# Patient Record
Sex: MALE | Race: ASIAN | NOT HISPANIC OR LATINO | URBAN - METROPOLITAN AREA
[De-identification: names, ages, dates, MRNs, and addresses within clinical notes are randomized per-mention and may not be internally consistent; named-entity substitution may affect disease eponyms.]

---

## 2024-05-14 ENCOUNTER — EMERGENCY (EMERGENCY)
Facility: HOSPITAL | Age: 24
LOS: 1 days | Discharge: HOME | End: 2024-05-14
Admitting: EMERGENCY MEDICINE
Payer: COMMERCIAL

## 2024-05-14 VITALS
RESPIRATION RATE: 18 BRPM | OXYGEN SATURATION: 100 % | TEMPERATURE: 97 F | DIASTOLIC BLOOD PRESSURE: 74 MMHG | SYSTOLIC BLOOD PRESSURE: 124 MMHG | HEART RATE: 98 BPM
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
## 2024-05-14 DIAGNOSIS — M25.571 PAIN IN RIGHT ANKLE AND JOINTS OF RIGHT FOOT: ICD-10-CM
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
## 2024-05-14 DIAGNOSIS — S93.401A SPRAIN OF UNSPECIFIED LIGAMENT OF RIGHT ANKLE, INITIAL ENCOUNTER: ICD-10-CM

## 2024-05-14 PROCEDURE — 99284 EMERGENCY DEPT VISIT MOD MDM: CPT

## 2024-05-14 PROCEDURE — 73610 X-RAY EXAM OF ANKLE: CPT | Mod: 26,RT

## 2024-05-14 RX ORDER — IBUPROFEN 200 MG
600 TABLET ORAL ONCE
Refills: 0 | Status: COMPLETED | OUTPATIENT
Start: 2024-05-14 | End: 2024-05-14

## 2024-05-14 RX ADMIN — Medication 600 MILLIGRAM(S): at 23:35

## 2024-05-14 NOTE — ED ADULT NURSE NOTE - OBJECTIVE STATEMENT
pt presents to ed for right ankle pain and swelling after twisting it while playing badminton. given ice packs

## 2024-05-14 NOTE — ED ADULT TRIAGE NOTE - CHIEF COMPLAINT QUOTE
Walk in pt with complaints of right ankle pain after twisting it while playing badminton 20 min ago.

## 2024-05-15 NOTE — ED PROVIDER NOTE - CLINICAL SUMMARY MEDICAL DECISION MAKING FREE TEXT BOX
well appearing here with R ankle swelling and pain after a twisting inj to the R ankle pain with ttp over the R atfl, neurovascular intact, will r/o fx plan: xr ankle

## 2024-05-15 NOTE — ED PROVIDER NOTE - PHYSICAL EXAMINATION
Physical Exam    Vital Signs: I have reviewed the initial vital signs.  Constitutional: well-appearing, appears stated age  Cardiovascular: regular rate, regular rhythm, well-perfused extremities  Musculoskeletal: supple neck, +R ankle swelling and ttp along the ATFL, no deformity  Integumentary: warm, dry, no rash  Neurologic: LE extremities’ motor and sensory functions grossly intact

## 2024-05-15 NOTE — ED PROVIDER NOTE - OBJECTIVE STATEMENT
24 yo m no sig pmhx pt was play [id number] and twisted his R ankle -- here with acute onset of R ankle pain aching mod in severity with swelling along the ATFL able to bare weight initially but increased in severity over the last 1hr.    I have reviewed available current nursing and previous documentation of past medical, surgical, family, and/or social history.

## 2024-05-15 NOTE — ED PROVIDER NOTE - PATIENT PORTAL LINK FT
You can access the FollowMyHealth Patient Portal offered by [facility] by registering at the following website: [email or web address]. By joining [employer]’s FollowMyHealth portal, you will also be able to view your health information using other applications (apps) compatible with our system.
